# Patient Record
Sex: FEMALE | Race: WHITE
[De-identification: names, ages, dates, MRNs, and addresses within clinical notes are randomized per-mention and may not be internally consistent; named-entity substitution may affect disease eponyms.]

---

## 2020-02-29 ENCOUNTER — HOSPITAL ENCOUNTER (EMERGENCY)
Dept: HOSPITAL 62 - ER | Age: 35
LOS: 1 days | Discharge: HOME | End: 2020-03-01
Payer: SELF-PAY

## 2020-02-29 DIAGNOSIS — F17.200: ICD-10-CM

## 2020-02-29 DIAGNOSIS — L60.0: Primary | ICD-10-CM

## 2020-02-29 PROCEDURE — 11765 WEDGE EXCISION SKN NAIL FOLD: CPT

## 2020-02-29 PROCEDURE — 99283 EMERGENCY DEPT VISIT LOW MDM: CPT

## 2020-02-29 NOTE — ER DOCUMENT REPORT
ED Medical Screen (RME)





- General


Chief Complaint: Skin Problem


Stated Complaint: TOE PAIN


Time Seen by Provider: 02/29/20 22:05


Mode of Arrival: Ambulatory


Information source: Patient


Notes: 





34-year-old female presented to ED for complaint of pain to the lateral aspect 

of the left great toe nail.  She had has had the pain for 4 days.  She denies 

any fevers.  She states she has been soaking it in some water with baking soda. 

She states it is not getting better.  She has not had any fevers.  She has not 

had any drainage.  It does appear to be an ingrown toenail.  I will have her 

soak her foot in some soapy water and have her reexamined after it has been 

soaked.  Patient is alert oriented respirations regular nonlabored speaking in 

full sentences.




















I have greeted and performed a rapid initial assessment of this patient.  A 

comprehensive ED assessment and evaluation of the patient, analysis of test 

results and completion of medical decision making process will be conducted by 

an additional ED providers.


TRAVEL OUTSIDE OF THE [place] IN LAST 30 DAYS: No





Physical Exam





- Vital signs


Vitals: 





                                        











Temp Pulse Resp BP Pulse Ox


 


 97.7 F   71   16   114/68   99 


 


 02/29/20 21:43  02/29/20 21:43  02/29/20 21:43  02/29/20 21:43  02/29/20 21:43














Course





- Vital Signs


Vital signs: 





                                        











Temp Pulse Resp BP Pulse Ox


 


 97.7 F   71   16   114/68   99 


 


 02/29/20 21:43  02/29/20 21:43  02/29/20 21:43  02/29/20 21:43  02/29/20 21:43

## 2020-03-01 VITALS — SYSTOLIC BLOOD PRESSURE: 116 MMHG | DIASTOLIC BLOOD PRESSURE: 72 MMHG

## 2020-03-01 NOTE — ER DOCUMENT REPORT
ED General





- General


Chief Complaint: Skin Problem


Stated Complaint: TOE PAIN


Time Seen by Provider: 02/29/20 22:05


Mode of Arrival: Ambulatory


Information source: Patient


TRAVEL OUTSIDE OF THE [place] IN LAST 30 DAYS: No





- HPI


Onset: Other - over the last 4-5 days


Onset/Duration: Gradual


Quality of pain: Pressure, Throbbing


Severity: Mild


Pain Level: 2


Associated symptoms: Other - swelling and redness of skin around her left great 

toenail


Exacerbated by: Walking, Other - palpation of her toe


Relieved by: Denies


Similar symptoms previously: No


Recently seen / treated by doctor: No


Notes: 





34 year old female with no known PMH here for pain around her left great toenail

on the outer aspect of the nail with redness and swelling of the skin. The 

patient denies a history of ingrown toe nails. The patient denies fevers, 

chills, sweats, nausea, vomiting, drainage from her toe. The patient denies 

trauma to her toe or toe nail. 





- Related Data


Allergies/Adverse Reactions: 


                                        





No Known Allergies Allergy (Verified 02/29/20 22:16)


   











Past Medical History





- General


Information source: Patient





- Social History


Smoking Status: Current Every Day Smoker


Frequency of alcohol use: None


Drug Abuse: None


Lives with: Family


Family History: Reviewed & Not Pertinent


Patient has suicidal ideation: No


Patient has homicidal ideation: No





Review of Systems





- Review of Systems


Constitutional: No symptoms reported


EENT: No symptoms reported


Cardiovascular: No symptoms reported


Respiratory: No symptoms reported


Gastrointestinal: No symptoms reported


Genitourinary: No symptoms reported


Female Genitourinary: No symptoms reported


Musculoskeletal: No symptoms reported


Skin: Other


Hematologic/Lymphatic: No symptoms reported


Neurological/Psychological: No symptoms reported


-: Yes All other systems reviewed and negative





Physical Exam





- Vital signs


Vitals: 


                                        











Temp Pulse Resp BP Pulse Ox


 


 97.7 F   71   16   114/68   99 


 


 02/29/20 21:43  02/29/20 21:43  02/29/20 21:43  02/29/20 21:43  02/29/20 21:43














- Notes


Notes: 





GENERAL: Well-appearing, well-nourished and in no acute distress.


HEAD: Atraumatic, normocephalic.


EYES: Pupils equal round and reactive to light, extraocular movements intact, 

sclera anicteric, conjunctiva are normal.


ENT: TMs normal, nares patent, oropharynx clear without exudates.  Moist mucous 

membranes.


NECK: Normal range of motion, supple without lymphadenopathy or JVD.


LUNGS: Breath sounds clear to auscultation bilaterally and equal.  No wheezes 

rales or rhonchi.


HEART: Regular rate and rhythm without murmurs, rubs or gallops.


ABDOMEN: Soft, nontender, normoactive bowel sounds.  No guarding, no rebound.  

No masses appreciated.


EXTREMITIES: Normal range of motion, no pitting or edema.  No clubbing or 

cyanosis.


NEUROLOGICAL: Cranial nerves II through XII grossly intact.  Normal speech, 

normal gait.


PSYCH: Normal mood, normal affect.


SKIN: Left great toe with an ingrown toenail on the lateral aspect of the nail 

bed. Skin around this area is erythematous and warm and swollen (looks like a 

paronychia). Warm, Dry, normal turgor, no rashes or lesions noted.





Course





- Re-evaluation


Re-evalutation: 





03/01/20 00:38


The patient has a left great toe ingrown toenail. It appeared there could be a 

paronychia with a drainable collection of pus but during the wedge resection of 

the patient's toe nail no pus was able to be expressed once the patient's toe 

nail was unroofed from the skin. Patient told to use topical antibiotic ointment

and she was DCed with a script for Keflex given the skin around her great toe 

looked somewhat infected.





- Vital Signs


Vital signs: 


                                        











Temp Pulse Resp BP Pulse Ox


 


 97.7 F   71   16   114/68   99 


 


 02/29/20 21:43  02/29/20 21:43  02/29/20 21:43  02/29/20 21:43  02/29/20 21:43














Procedures





- Additional Procedures


  ** Ingrown Toe Nail Wedge Resection


Time performed: 00:20


Notes: 





03/01/20 00:35


2cc of 1% Lidocaine was injected into the medial aspect of the patients left 

great toe to perform a digital block after betadine was applied. I then used a 

#11 bladed to remove a small wedge of the patients left great toe nail (lateral 

aspect). Although it appears there was a paronychia with drainable pus in the 

lateral aspect of the patient's left great toe, no purulent material was 

expressed once the nail was unroofed from the skin.





Discharge





- Discharge


Clinical Impression: 


 Ingrown left greater toenail





Condition: Stable


Disposition: HOME, SELF-CARE


Instructions:  Ingrown Nail ([facility])


Additional Instructions: 


Keep the dressing on your toe for 24 hours. After that wash your toe in soap and

water several times a day and keep your toe covered in antbiotic ointmnent until

it is completely healed. Take the prescribed oral antibiotics (Keflex) as well. 

Follow up with your primary care doctor or the primary care doctor listed in 

your paperwork today.


Prescriptions: 


Cephalexin Monohydrate [Keflex 500 mg Capsule] 500 mg PO Q6H 7 Days #28 capsule


Referrals: 


LIU MURRAY MD [[facility]] - Follow up as needed